# Patient Record
Sex: FEMALE | Race: WHITE
[De-identification: names, ages, dates, MRNs, and addresses within clinical notes are randomized per-mention and may not be internally consistent; named-entity substitution may affect disease eponyms.]

---

## 2020-03-10 ENCOUNTER — HOSPITAL ENCOUNTER (OUTPATIENT)
Dept: HOSPITAL 62 - OD | Age: 30
End: 2020-03-10
Attending: OBSTETRICS & GYNECOLOGY
Payer: MEDICAID

## 2020-03-10 VITALS — DIASTOLIC BLOOD PRESSURE: 69 MMHG | SYSTOLIC BLOOD PRESSURE: 111 MMHG

## 2020-03-10 DIAGNOSIS — Z01.812: Primary | ICD-10-CM

## 2020-03-10 DIAGNOSIS — Z01.818: ICD-10-CM

## 2020-03-10 LAB
ANION GAP SERPL CALC-SCNC: 10 MMOL/L (ref 5–19)
APPEARANCE UR: CLEAR
APTT PPP: (no result) S
BILIRUB UR QL STRIP: NEGATIVE
BUN SERPL-MCNC: 8 MG/DL (ref 7–20)
CALCIUM: 9 MG/DL (ref 8.4–10.2)
CHLORIDE SERPL-SCNC: 103 MMOL/L (ref 98–107)
CO2 SERPL-SCNC: 27 MMOL/L (ref 22–30)
ERYTHROCYTE [DISTWIDTH] IN BLOOD BY AUTOMATED COUNT: 12.9 % (ref 11.5–14)
GLUCOSE SERPL-MCNC: 80 MG/DL (ref 75–110)
GLUCOSE UR STRIP-MCNC: NEGATIVE MG/DL
HCT VFR BLD CALC: 41.8 % (ref 36–47)
HGB BLD-MCNC: 14.2 G/DL (ref 12–15.5)
KETONES UR STRIP-MCNC: NEGATIVE MG/DL
MCH RBC QN AUTO: 30.5 PG (ref 27–33.4)
MCHC RBC AUTO-ENTMCNC: 34 G/DL (ref 32–36)
MCV RBC AUTO: 90 FL (ref 80–97)
NITRITE UR QL STRIP: NEGATIVE
PH UR STRIP: 7 [PH] (ref 5–9)
PLATELET # BLD: 176 10^3/UL (ref 150–450)
POTASSIUM SERPL-SCNC: 4 MMOL/L (ref 3.6–5)
PROT UR STRIP-MCNC: NEGATIVE MG/DL
RBC # BLD AUTO: 4.65 10^6/UL (ref 3.72–5.28)
SP GR UR STRIP: 1
UROBILINOGEN UR-MCNC: NEGATIVE MG/DL (ref ?–2)
WBC # BLD AUTO: 7.8 10^3/UL (ref 4–10.5)

## 2020-03-10 PROCEDURE — 81005 URINALYSIS: CPT

## 2020-03-10 PROCEDURE — 36415 COLL VENOUS BLD VENIPUNCTURE: CPT

## 2020-03-10 PROCEDURE — 85027 COMPLETE CBC AUTOMATED: CPT

## 2020-03-10 PROCEDURE — 80048 BASIC METABOLIC PNL TOTAL CA: CPT

## 2020-05-13 LAB
APPEARANCE UR: (no result)
APTT PPP: (no result) S
BILIRUB UR QL STRIP: NEGATIVE
ERYTHROCYTE [DISTWIDTH] IN BLOOD BY AUTOMATED COUNT: 12.9 % (ref 11.5–14)
GLUCOSE UR STRIP-MCNC: NEGATIVE MG/DL
HCT VFR BLD CALC: 40.9 % (ref 36–47)
HGB BLD-MCNC: 14.2 G/DL (ref 12–15.5)
KETONES UR STRIP-MCNC: NEGATIVE MG/DL
MCH RBC QN AUTO: 30.7 PG (ref 27–33.4)
MCHC RBC AUTO-ENTMCNC: 34.6 G/DL (ref 32–36)
MCV RBC AUTO: 89 FL (ref 80–97)
NITRITE UR QL STRIP: NEGATIVE
PH UR STRIP: 6 [PH] (ref 5–9)
PLATELET # BLD: 163 10^3/UL (ref 150–450)
PROT UR STRIP-MCNC: NEGATIVE MG/DL
RBC # BLD AUTO: 4.61 10^6/UL (ref 3.72–5.28)
SP GR UR STRIP: 1.01
UROBILINOGEN UR-MCNC: NEGATIVE MG/DL (ref ?–2)
WBC # BLD AUTO: 8.1 10^3/UL (ref 4–10.5)

## 2020-05-18 ENCOUNTER — HOSPITAL ENCOUNTER (OUTPATIENT)
Dept: HOSPITAL 62 - OROUT | Age: 30
Discharge: HOME | End: 2020-05-18
Attending: OBSTETRICS & GYNECOLOGY
Payer: MEDICAID

## 2020-05-18 VITALS — DIASTOLIC BLOOD PRESSURE: 61 MMHG | SYSTOLIC BLOOD PRESSURE: 96 MMHG

## 2020-05-18 DIAGNOSIS — F17.210: ICD-10-CM

## 2020-05-18 DIAGNOSIS — R10.32: ICD-10-CM

## 2020-05-18 DIAGNOSIS — Z03.818: ICD-10-CM

## 2020-05-18 DIAGNOSIS — K66.0: Primary | ICD-10-CM

## 2020-05-18 DIAGNOSIS — Z79.3: ICD-10-CM

## 2020-05-18 DIAGNOSIS — N83.11: ICD-10-CM

## 2020-05-18 DIAGNOSIS — N80.3: ICD-10-CM

## 2020-05-18 DIAGNOSIS — Z88.0: ICD-10-CM

## 2020-05-18 PROCEDURE — 36415 COLL VENOUS BLD VENIPUNCTURE: CPT

## 2020-05-18 PROCEDURE — 85027 COMPLETE CBC AUTOMATED: CPT

## 2020-05-18 PROCEDURE — 87635 SARS-COV-2 COVID-19 AMP PRB: CPT

## 2020-05-18 PROCEDURE — 81005 URINALYSIS: CPT

## 2020-05-18 PROCEDURE — 49329 UNLSTD LAPS PX ABD PERTM&OMN: CPT

## 2020-05-18 PROCEDURE — 81025 URINE PREGNANCY TEST: CPT

## 2020-05-18 NOTE — OPERATIVE REPORT
Operative Report


DATE OF SURGERY: 05/18/20


PREOPERATIVE DIAGNOSIS: Left lower quadrant pain


POSTOPERATIVE DIAGNOSIS: Left lower quadrant pain and endometriosis and 

adhesions


OPERATION: Diagnostic laparoscopy lysis of adhesions


SURGEON: HA PORTER


ANESTHESIA: GA


TISSUE REMOVED OR ALTERED: None


COMPLICATIONS: 





None


ESTIMATED BLOOD LOSS: Negligible


INTRAOPERATIVE FINDINGS: Adhesions from the large bowel to left adnexal wall and

spotty endometriosis


PROCEDURE: 





Patient placed in a dorsal lithotomy vision prepped dural sterile fashion.  The 

bladder was drained with a catheter a speculum is placed cervix visualized and 

grasped with a single-tooth tenaculum replaced with a Hulka tenaculum single-

tooth neck was removed speculum was removed.  Attention turned to the abdomen 

where a subumbilical semilunar incision was made trocar was introduced with 

insufflation the abdomen.  Laparoscope was introduced and there was spotty 

endometriotic implants in the cul-de-sac and along the mesosalpinx.  The right 

tube was normal and the right ovary had a corpus luteal cyst.  On the left the 

tube and ovary were normal and free but there were dense adhesions from the 

large bowel to the left adnexa wall.  Second puncture was made and a 5 trocar 

was introduced with introduction of the harmonic scalpel.  Adhesions on the left

were taken down with sharp and blunt dissection.  Very of the abdomen revealed 

no other abnormalities.  Most were removed and abdomen deflated and trocar 

sleeves removed.  The likely incision was closed with 0 Vicryl for the fascia 4 

oh subcu for the skin the puncture wound on the left was closed with a subcu of 

4-0 Vicryl.  Patient was taken to recovery room in good condition.